# Patient Record
(demographics unavailable — no encounter records)

---

## 2024-11-18 NOTE — ADDENDUM
[FreeTextEntry1] : POCT glucose testing and Hemoglobin A1c was carried out today given diabetes diagnosis.  This note was written by Demetris Salas on 11/18/2024 acting as medical scribe for Dr. Tejinder Gloria. I, Dr. Tejinder Gloria, have read and attest that all the information, medical decision making and discharge instructions within are true and accurate.

## 2024-11-18 NOTE — HISTORY OF PRESENT ILLNESS
[FreeTextEntry1] : Mr. Eaton is a 76-year-old male who returns today in with regard to a history of type 2 diabetes mellitus. There is no known history of retinopathy, or nephropathy. With regard to neuropathy, he denies any neurologic s/s.  Current DM medications include Jardiance 25mg po daily, Metformin 500mg two tablets BiD, Mounjaro 5 mg sc weekly, along with Lantus 20 units HS, and usually taking 20 units of Fiasp pre meals.   Patient had been seeing weight loss on Mounjaro, but then had plateau with weight regain.  His current weight is 296 pounds, previously 302 pounds in June 2023. States weight had gone down to 280s.   Home glucose monitoring via Dexcom (wears on anterolateral thigh) has shown values of late to be averaging 117 with a variation of 27.9%.   He does deny any significant hypoglycemic signs and symptoms of late.  POCT A1C returned today at 5.7%.  POCT glucose today at 87 mg/dL.  Denies any chest pain, sob, neurologic or ophthalmologic complaints. He too denies any new podiatric concerns. Ophthalmologic evaluation is up to date- no diabetic changes noted. ____________________________________________________________________________________________________  Does have underlying Primary HPT. September 2021 Had partial parathyroidectomy with Dr. Kermit Monson at [place]. Had one gland removed. Calcium ran in the 11-range prior, stable since. ____________________________________________________________________________________________________  Patient had abnormal stress test and abnormal EKG at [facility]: Had Angiogram on 6/11/2023 which was normal.  Additional diagnoses include that of vitamin d deficiency, HTN, MIKE - Has had diffuse body itching saw derm and allergist; is on Claritin  Supplements: vitamin D3 4,000 BID iu daily.

## 2025-02-10 NOTE — PHYSICAL EXAM
[No Acute Distress] : no acute distress [Well Nourished] : well nourished [Well Developed] : well developed [Low Lying Soft Palate] : low lying soft palate [No Resp Distress] : no resp distress [No Acc Muscle Use] : no acc muscle use [Clear to Auscultation Bilaterally] : clear to auscultation bilaterally [Oriented x3] : oriented x3

## 2025-03-24 NOTE — HISTORY OF PRESENT ILLNESS
[FreeTextEntry1] : Mr. Eaton is a 76-year-old male who returns today in with regard to a history of type 2 diabetes mellitus. There is no known history of retinopathy, or nephropathy. With regard to neuropathy, he denies any neurologic s/s.  Additional diagnoses include that of vitamin d deficiency, hypertension, MIKE - Has had diffuse body itching saw derm and allergist; is on Claritin  Current DM medications include Jardiance 25 mg po daily, Metformin 500 mg two tablets BiD, Mounjaro 7.5 mg sc weekly, along with Lantus 22 units HS, and usually taking 20-25 units of Fiasp pre meals. Overall, tolerating the medications well. Rarely gets constipation.  Reports weight loss on Mounjaro. His current weight is 280 pounds, previously 296 pounds in November 2024.  Home glucose monitoring via Dexcom (wears on anterolateral thigh) has shown values of late to be 169 this AM. 108 currently in today's o/v. He does deny any significant hypoglycemic signs and symptoms of late.  Sometimes snacks on fruit cups.  POCT A1C returned today at 5.7%. POCT glucose today at 122 mg/dL.  Denies any chest pain, sob, neurologic or ophthalmologic complaints. He too denies any new podiatric concerns. Ophthalmologic evaluation is up to date- no diabetic changes noted. May have minor cataracts. ____________________________________________________________________________________________________  Does have underlying Primary HPT. September 2021 Had partial parathyroidectomy with Dr. Kermit Monson at [place]. Had one gland removed. Calcium ran in the 11-range prior, stable since. ____________________________________________________________________________________________________  Patient had abnormal stress test and abnormal EKG at [facility]: Had Angiogram on 6/11/2023 which was normal.  Supplements: vitamin D3 4,000 BID iu daily.

## 2025-03-24 NOTE — ADDENDUM
[FreeTextEntry1] : POCT glucose testing and Hemoglobin A1c was carried out today given diabetes diagnosis. Blood will be drawn in office today.  This note was written by Mary Farmer on 03/24/2025 acting as medical scribe for Dr. Tejinder Gloria. I, Dr. Tejinder Gloria, have read and attest that all the information, medical decision making and discharge instructions within are true and accurate.

## 2025-03-24 NOTE — HISTORY OF PRESENT ILLNESS
[FreeTextEntry1] : Mr. Eaton is a 76-year-old male who returns today in with regard to a history of type 2 diabetes mellitus. There is no known history of retinopathy, or nephropathy. With regard to neuropathy, he denies any neurologic s/s.  Additional diagnoses include that of vitamin d deficiency, hypertension, MIKE - Has had diffuse body itching saw derm and allergist; is on Claritin  Current DM medications include Jardiance 25 mg po daily, Metformin 500 mg two tablets BiD, Mounjaro 7.5 mg sc weekly, along with Lantus 22 units HS, and usually taking 20-25 units of Fiasp pre meals. Overall, tolerating the medications well. Rarely gets constipation.  Reports weight loss on Mounjaro. His current weight is 280 pounds, previously 296 pounds in November 2024.  Home glucose monitoring via Dexcom (wears on anterolateral thigh) has shown values of late to be 169 this AM. 108 currently in today's o/v. He does deny any significant hypoglycemic signs and symptoms of late.  Sometimes snacks on fruit cups.  POCT A1C returned today at 5.7%. POCT glucose today at 122 mg/dL.  Denies any chest pain, sob, neurologic or ophthalmologic complaints. He too denies any new podiatric concerns. Ophthalmologic evaluation is up to date- no diabetic changes noted. May have minor cataracts. ____________________________________________________________________________________________________  Does have underlying Primary HPT. September 2021 Had partial parathyroidectomy with Dr. Kermit Monson at [place]. Had one gland removed. Calcium ran in the 11-range prior, stable since. ____________________________________________________________________________________________________  Patient had abnormal stress test and abnormal EKG at [facility]: Had Angiogram on 6/11/2023 which was normal.  Supplements: vitamin D3 4,000 BID iu daily.

## 2025-03-24 NOTE — HISTORY OF PRESENT ILLNESS
[FreeTextEntry1] : Mr. Eaton is a 76-year-old male who returns today in with regard to a history of type 2 diabetes mellitus. There is no known history of retinopathy, or nephropathy. With regard to neuropathy, he denies any neurologic s/s.  Additional diagnoses include that of vitamin d deficiency, hypertension, MIKE - Has had diffuse body itching saw derm and allergist; is on Claritin  Current DM medications include Jardiance 25 mg po daily, Metformin 500 mg two tablets BiD, Mounjaro 7.5 mg sc weekly, along with Lantus 22 units HS, and usually taking 20-25 units of Fiasp pre meals. Overall, tolerating the medications well. Rarely gets constipation.  Reports weight loss on Mounjaro. His current weight is 280 pounds, previously 296 pounds in November 2024.  Home glucose monitoring via Dexcom (wears on anterolateral thigh) has shown values of late to be 169 this AM. 108 currently in today's o/v. He does deny any significant hypoglycemic signs and symptoms of late.  Sometimes snacks on fruit cups.  POCT A1C returned today at 5.7%. POCT glucose today at 122 mg/dL.  Denies any chest pain, sob, neurologic or ophthalmologic complaints. He too denies any new podiatric concerns. Ophthalmologic evaluation is up to date- no diabetic changes noted. May have minor cataracts. ____________________________________________________________________________________________________  Does have underlying Primary HPT. September 2021 Had partial parathyroidectomy with Dr. Kermit Monson at [place]. Had one gland removed. Calcium ran in the 11-range prior, stable since. ____________________________________________________________________________________________________  Patient had abnormal stress test and abnormal EKG at [facility]: Had Angiogram on 6/11/2023 which was normal.  Supplements: vitamin D3 4,000 BID iu daily.
